# Patient Record
Sex: MALE | Race: OTHER | HISPANIC OR LATINO
[De-identification: names, ages, dates, MRNs, and addresses within clinical notes are randomized per-mention and may not be internally consistent; named-entity substitution may affect disease eponyms.]

---

## 2023-01-17 PROBLEM — Z00.00 ENCOUNTER FOR PREVENTIVE HEALTH EXAMINATION: Status: ACTIVE | Noted: 2023-01-17

## 2023-01-19 ENCOUNTER — APPOINTMENT (OUTPATIENT)
Dept: PHYSICAL MEDICINE AND REHAB | Facility: CLINIC | Age: 68
End: 2023-01-19

## 2023-02-07 ENCOUNTER — APPOINTMENT (OUTPATIENT)
Dept: PHYSICAL MEDICINE AND REHAB | Facility: CLINIC | Age: 68
End: 2023-02-07
Payer: MEDICARE

## 2023-02-07 VITALS
DIASTOLIC BLOOD PRESSURE: 72 MMHG | OXYGEN SATURATION: 96 % | SYSTOLIC BLOOD PRESSURE: 118 MMHG | HEIGHT: 69 IN | WEIGHT: 205 LBS | BODY MASS INDEX: 30.36 KG/M2 | HEART RATE: 84 BPM

## 2023-02-07 DIAGNOSIS — M25.662 STIFFNESS OF LEFT KNEE, NOT ELSEWHERE CLASSIFIED: ICD-10-CM

## 2023-02-07 PROCEDURE — 99205 OFFICE O/P NEW HI 60 MIN: CPT

## 2023-02-07 PROCEDURE — 73564 X-RAY EXAM KNEE 4 OR MORE: CPT | Mod: LT

## 2023-02-07 RX ORDER — HYALURONATE SODIUM 30 MG/2 ML
30 SYRINGE (ML) INTRAARTICULAR
Qty: 3 | Refills: 0 | Status: ACTIVE | COMMUNITY
Start: 2023-02-07 | End: 1900-01-01

## 2023-02-08 NOTE — ASSESSMENT
[FreeTextEntry1] :                                                       Assessment/Plan:\par \par LIBIA ISABEL is a 67 year male with left knee pain here for initial consultation.\par \par Osteoarthritis of the knee, left\par Weakness of the knee, left\par Effusion of the knee, left\par Derangement of the lateral meniscus, left\par Antalgic gait\par \par - Tiers of treatment and management of above diagnosis(es) were discussed with patient\par - Optimal diet, weight, sleep, and lifestyle management to minimize stress and maximize well being counseling provided\par - Imaging reviewed and discussed with patient\par - Patient was advised to start a structured, targeted therapy program 2-3x/wk for 8 wks with goal toward HEP\par - Patient was educated on an appropriate home exercise program, provided with exercise recommendations, all questions answered\par - Patient may trial acetaminophen 1000mg up to TID PRN moderate to severe pain and to decrease average consumption of NSAIDs\par - Patient was advised to start Meloxicam 15 mg daily with food/milk for 5-7 days to help with pain and to decrease inflammation, afterwards as needed\par - Patient may trial topical diclofenac 1% gel QID to the site of their worst pain (Knee) for the next 7 to 10 days to help with pain and relieve inflammation. Afterwards, they were advised to use only as needed.\par - Patient was advised to apply cool compresses or warm heat to affected regions PRN\par - Radiographs of knee ordered and reviewed today \par \par - Given that the pt has not improved with has not improved with tylenol, ibuprofen, naproxen, meloxicam and physical therapy/home exercise program>6 weeks, ultrasound guided left knee viscosupplementation with orthovisc or similar (series of three) was recommended. The purpose, risks, benefits, alternatives were discussed. The patient agreed to proceed. Will schedule once authorization obtained.\par \par - Follow up in person in 1 month to assess their progress, for viscosupplementation series\par \par I have personally spent a total of at least 65 minutes preparing, reviewing internal and external records, explaining, counseling, and coordinating care for this patient encounter.\par \par Thank you, Dr, for allowing me to participate in the care of your patient. Please do not hesitate to contact me with questions/concerns.\par \par Chip Shaffer M.D.\par Sports and Interventional Spine\par Department of Physical Medicine and Rehabilitation \par [facility] \par Email: [email or web address]\par \par [facility]\par [facility]\par [address]\par [facility], 11th Floor\[place] [address]\par \par Appointments: [phone]\par Fax: [phone]\par

## 2023-02-08 NOTE — DATA REVIEWED
[Plain X-Rays] : plain X-Rays [FreeTextEntry1] : Weight bearing x-rays of the LEFT knees (AP, flexion weight bearing, lateral, and merchant views) demonstrate medial > lateral joint space narrowing, neg fracture or dislocation, + narrowing in the patellofemoral joint, osteophytes along the lateral patellar border\par

## 2023-02-08 NOTE — PHYSICAL EXAM
[FreeTextEntry1] : GEN: NAD, well dressed, well nourished\par HEENT: NCAT, oropharynx clear\par CV: S1S2, RRR\par RESP: on room air, no labored breathing\par ABD: non distended\par EXT: well perfused, no calf tenderness\par \par RIGHT KNEE:\par no scars\par no effusion\par no laceration\par no increased warmth\par no erythema\par no varus deformity\par no valgus deformity\par absent J sign\par \par ROM\par Full range of motion in extension, no flexion contracture\par No added AROM/PROM with knee extension\par Full range of motion in flexion, 0-135\par \par Palpation\par + crepitus\par neg TTP over the quadriceps tendon\par neg TTP over the base of the patella\par neg TTP over the apex of the patella\par neg TTP over medial border of the patella\par neg TTP over lateral border of the patella\par +TTP over the medial joint line\par +TTP over the lateral joint line\par neg TTP over pes anserine area in the medial face of the tibia\par neg TTP over tibial tuberosity\par neg TTP over fibular head\par neg TTP over the popliteal fossa\par \par Manual Muscle Testing\par 5/5 in all planes with resisted isometric stress\par \par +laxity with varus stress with the knee extended at 0\par neg laxity with varus stress with the knee extended at 30\par neg laxity with valgus stress with the knee extended at 0\par +laxity with valgus stress with the knee extended at 30\par neg Lachmann Test\par neg Anterior drawer\par neg Posterior drawer\par +Bae’s Sign\par +Patellar apprehension\par neg Apley’s Rotation-Distraction Test (increased rotation, ligamentous) vs contralateral limb\par neg Apley’s Rotation-Compression Test (decreased rotation, meniscal) vs contralateral limb\par neg Barb Test with Tibia Externally Rotated (MM)\par neg Barb Test with Tibia Internally Rotated (LM)\par neg Thessaly Test\par \par Sensation intact to light touch over all aspects of the right lower leg\par Distal pulses intact\par \par LEFT KNEE:\par no scars\par trace effusion\par no laceration\par no increased warmth\par no erythema\par no varus deformity\par no valgus deformity\par absent  J sign\par \par ROM\par Limited range of motion with extension, lacking terminal 2-3\par Limited range of motion with flexion, 125\par \par Palpation\par + crepitus\par neg TTP over the quadriceps tendon\par neg TTP over the base of the patella\par neg TTP over the apex of the patella\par neg TTP over medial border of the patella\par neg TTP over lateral border of the patella\par +TTP over the medial joint line\par ++TTP over the lateral joint line\par neg TTP over pes anserine area in the medial face of the tibia\par neg TTP over tibial tuberosity\par +TTP over fibular head\par neg TTP over the popliteal fossa\par \par Manual Muscle Testing\par 4/5 in all planes with resisted isometric stress\par \par neg laxity with varus stress with the knee extended at 0\par neg laxity with varus stress with the knee extended at 30\par +laxity with valgus stress with the knee extended at 0\par +laxity with valgus stress with the knee extended at 30\par neg Lachmann Test\par neg Anterior drawer\par neg Posterior drawer\par +Bae’s Sign\par neg Patellar apprehension\par neg Apley’s Rotation-Distraction Test (increased rotation, ligamentous) vs contralateral limb\par neg Apley’s Rotation-Compression Test (decreased rotation, meniscal) vs contralateral limb\par neg Barb Test with Tibia Externally Rotated (MM)\par +Barb Test with Tibia Internally Rotated (LM)\par neg Thessaly Test\par \par Sensation intact to light touch over all aspects of the left lower leg\par Distal pulses intact\par \par + Antalgic gait\par \par

## 2023-02-08 NOTE — HISTORY OF PRESENT ILLNESS
[FreeTextEntry1] : Chip Shaffer M.D.\par Sports Medicine and Interventional Spine\par Department of Physical Medicine and Rehabilitation \par [facility] \par Email: [email or web address] <[email or web address]>\par \par [facility]\par [facility] [place] [facility]\par [address]\par [facility], 11th Floor\[place] [address]\par \par [facility]\par [facility] [place] at [facility]\par [address], 4th Floor\[place] [address]\par \par [facility] at \par [facility]\par [address], 6th Floor\[place] [address]\par \par [facility] at [facility]\par [address]\[place] [address]\par \par [facility] [place] \par [facility] at [facility]\par [address] [facility], [address]\[place] [address]\par \par For [place] Appointments\par Phone: [phone]\par Fax: [phone]\par \par For [place] Appointments\par Phone: [phone]\par Fax: [phone]\par \par \par ----------------------------------------------------------------------------------------------------------------------------------------\par \par PATIENT: LIBIA ISABEL \par MRN: [id number] \par YOB: 1955 \par DATE OF VISIT: 02/07/2023 \par Referred by PCP\par PCP ADDRESS:\[facility] \par Feb 07, 2023 \par \par \par Dear  \par \par Thank you for referring LIBIA ISABEL to my Sports and Interventional Spine practice and office. Enclosed is a copy of the patient's consultation/progress note, which includes my complete assessment and recent studies completed during the patient's evaluation.\par \par If you have questions or have any patients who require nonsurgical, non-opiate management of any sports, spine, or musculoskeletal conditions, please do not hesitate to contact my , Allie Madison at [phone].\par \par I look forward to taking care of your patients along with you.\par \par Sincerely,\par \par Chip\par \par Chip Shaffer MD\par Sports, Interventional Spine, & Regenerative Musculoskeletal Medicine\par [facility] [place] [facility]\par Email: [email or web address]\par \par \par                                                   Initial Consultation:\par CC: left knee pain \par \par HPI:  This is the first visit to [facility] [place] [facility] Interventional Spine Practice.  \par \par LIBIA ISABEL presents with the chief complaint as above.  \par \par Initial Hx on 02/07/2023 :\par Presents in person to [facility]\par patient reports left knee pain since 2020\par patient had a workup out of state in [place] including radiographs 1/2023\par pain occurs over the left anterolateral left knee, pain over the proximal tibia as well over the popliteal fossa as wel\par The patient’s difficulties began 2020, worsening over the past few weeks\par patient reports being involved in basketball and running activities as recently as 3-4 weeks ago\par of note, patient had arthroscopic surgery of the B/L knees at [facility] \par The pain is graded as 4/10\par The pain is described as sharp, stabbing\par The pain is intermittently worse during squatting, running\par The pain does not radiate\par The patient feels that the pain is overall persistent\par Patient denies other recent fall, MVA, injury, trauma, or accident besides presenting history above\par \par Aggravating:  navigating stairs, ambulation, running, squatting, prolonged sitting, prolonged standing, sit to stand transitional movements, HIIT classes\par Alleviating: rest, activity modification, pharmacologic treatments\par \par Meds: denies regular PO pain medications, ibuprofen 800mg\par Therapy Program: no recent structured targeted therapy program\par HEP: doing HEP regularly\par \par Assoc Sx:\par Denies numbness, Tingling\par Denies Focal motor weakness in the upper or lower limbs\par Denies New or worsened bowel or bladder incontinence\par Denies Saddle anesthesia\par Denies Buckling\par Denies Using Orthotic(s)/Supportive devices\par + Swelling in the upper/lower extremities, left knee as recently as 3 weeks ago\par Denies Clicking\par They also deny frequent tripping, falling\par \par ROS: A 14 point review of systems was completed. Positive findings are pain as described above. The remaining systems negative.\par \par Prostate Hx: up to date\par COVID HX: reviewed\par \par Assoc Hx:\par Ambulates without assistive device\par Injection Hx: denies locally directed treatment to the area in question\par Imaging Hx: reviewed\par \par Level of functioning: indep with ambulation, indep with ADLs\par Living Situation: dwelling with steps to enter

## 2023-03-16 RX ORDER — MELOXICAM 15 MG/1
15 TABLET ORAL
Qty: 30 | Refills: 0 | Status: ACTIVE | COMMUNITY
Start: 2023-02-07 | End: 1900-01-01

## 2023-03-27 ENCOUNTER — APPOINTMENT (OUTPATIENT)
Dept: PHYSICAL MEDICINE AND REHAB | Facility: CLINIC | Age: 68
End: 2023-03-27
Payer: MEDICARE

## 2023-03-27 VITALS
HEART RATE: 81 BPM | SYSTOLIC BLOOD PRESSURE: 144 MMHG | BODY MASS INDEX: 30.36 KG/M2 | WEIGHT: 205 LBS | OXYGEN SATURATION: 96 % | DIASTOLIC BLOOD PRESSURE: 82 MMHG | HEIGHT: 69 IN

## 2023-03-27 DIAGNOSIS — M17.12 UNILATERAL PRIMARY OSTEOARTHRITIS, LEFT KNEE: ICD-10-CM

## 2023-03-27 DIAGNOSIS — M25.462 EFFUSION, LEFT KNEE: ICD-10-CM

## 2023-03-27 DIAGNOSIS — M23.8X2 OTHER INTERNAL DERANGEMENTS OF LEFT KNEE: ICD-10-CM

## 2023-03-27 DIAGNOSIS — M17.0 BILATERAL PRIMARY OSTEOARTHRITIS OF KNEE: ICD-10-CM

## 2023-03-27 DIAGNOSIS — R29.898 OTHER SYMPTOMS AND SIGNS INVOLVING THE MUSCULOSKELETAL SYSTEM: ICD-10-CM

## 2023-03-27 PROCEDURE — 20610 DRAIN/INJ JOINT/BURSA W/O US: CPT | Mod: LT

## 2023-03-27 PROCEDURE — 99215 OFFICE O/P EST HI 40 MIN: CPT | Mod: 25

## 2023-03-28 ENCOUNTER — OUTPATIENT (OUTPATIENT)
Dept: OUTPATIENT SERVICES | Facility: HOSPITAL | Age: 68
LOS: 1 days | End: 2023-03-28
Payer: MEDICARE

## 2023-03-28 ENCOUNTER — RESULT REVIEW (OUTPATIENT)
Age: 68
End: 2023-03-28

## 2023-03-28 PROCEDURE — 73564 X-RAY EXAM KNEE 4 OR MORE: CPT

## 2023-03-28 PROCEDURE — 73564 X-RAY EXAM KNEE 4 OR MORE: CPT | Mod: 26,RT

## 2023-03-28 NOTE — ASSESSMENT
[FreeTextEntry1] :                                                       Assessment/Plan:\par \par LIBIA ISABEL is a 67 year male with left knee pain here for follow up visit\par \par Osteoarthritis of the knee, left\par Weakness of the knee, left\par Effusion of the knee, left\par Derangement of the lateral meniscus, left\par Antalgic gait\par Right knee pain, chronic\par \par - Tolerated injection in office today without complications\par - Tiers of treatment and management of above diagnosis(es) were discussed with patient\par - Optimal diet, weight, sleep, and lifestyle management to minimize stress and maximize well being counseling provided\par - Imaging reviewed and discussed with patient\par - Patient was advised to start a structured, targeted therapy program 2-3x/wk for 8 wks with goal toward HEP\par - Patient was educated on an appropriate home exercise program, provided with exercise recommendations, all questions answered\par - Patient may trial acetaminophen 1000mg up to TID PRN moderate to severe pain and to decrease average consumption of NSAIDs\par - Patient was advised to start Meloxicam 15 mg daily with food/milk for 5-7 days to help with pain and to decrease inflammation, afterwards as needed\par - Patient may trial topical diclofenac 1% gel QID to the site of their worst pain (Knee) for the next 7 to 10 days to help with pain and relieve inflammation. Afterwards, they were advised to use only as needed.\par - Patient was advised to apply cool compresses or warm heat to affected regions PRN\par - Radiographs of right knee ordered today \par \par - Follow up in person in 1 month to assess their progress, for viscosupplementation series\par \par I have personally spent a total of at least 65 minutes preparing, reviewing internal and external records, explaining, counseling, and coordinating care for this patient encounter.\par \par Thank you, , for allowing me to participate in the care of your patient. Please do not hesitate to contact me with questions/concerns.\par \par Chip Shaffer M.D.\par Sports and Interventional Spine\par Department of Physical Medicine and Rehabilitation \par [facility] \par Email: [email or web address]\par \par [facility]\par [facility]\par [address]\par [facility], 11th Floor\[place] [address]\par \par Appointments: [phone]\par Fax: [phone]\par

## 2023-03-28 NOTE — HISTORY OF PRESENT ILLNESS
[FreeTextEntry1] : Chip Shaffer M.D.\par Sports Medicine and Interventional Spine\par Department of Physical Medicine and Rehabilitation \par [facility] \par Email: [email or web address] <[email or web address]>\par \par [facility]\par [facility] [place] [facility]\par [address]\par [facility], 11th Floor\[place] [address]\par \par [facility]\par [facility] [place] at [facility]\par [address], 4th Floor\[place] [address]\par \par [facility] at \par Critical access hospital\par [address], 6[address] Floor\[place] [address]\par \par [facility] at [facility]\par [address] [facility]\[place] [address]\par \par [facility] [place] \par [facility]\par [address]\[place] [address]\par \par For [place] Appointments\par Phone: [phone]\par Fax: [phone]\par \par For [place] Appointments\par Phone: [phone]\par Fax: [phone]\par \par \par ----------------------------------------------------------------------------------------------------------------------------------------\par \par PATIENT: LIBIA ISABEL \par MRN: [id number] \par YOB: 1955 \par DATE OF VISIT: 3/27/2023\par Referred by PCP\par PCP ADDRESS:\[facility] \par Mar 27, 2023 \par \par \par Dear  \par \par Thank you for referring LIBIA ISABEL to my Sports and Interventional Spine practice and office. Enclosed is a copy of the patient's consultation/progress note, which includes my complete assessment and recent studies completed during the patient's evaluation.\par \par If you have questions or have any patients who require nonsurgical, non-opiate management of any sports, spine, or musculoskeletal conditions, please do not hesitate to contact my , Allie Madison at [phone].\par \par I look forward to taking care of your patients along with you.\par \par Sincerely,\par \par Chip\par \par Chip Shaffer MD\par Sports, Interventional Spine, & Regenerative Musculoskeletal Medicine\par [facility] [place] [facility]\par Email: [email or web address]\par \par \par                                                   Follow up Visit\par CC: left knee pain \par \par HPI:  This is a follow up visit to [facility] [place] [facility] Sports Medicine and Interventional Spine Practice.  \par \par LIBIA ISABEL presents with the chief complaint as above.  \par \par Interval Hx on Mar 27, 2023: presents for follow up. Since last visit, they continue to have left knee pain, has been using their  brace, had the brace re-adjusted with Eschen, had substantial relief during appropriate use of the brace. Patient has been having some right knee pain as well, patient interested in having that area surveyed.\par There have been no significant changes to their aggravating or alleviating factors since the last visit. Pharmacologic treatments now include OTC analgesics PRN, but otherwise pharmacologic treatments are minimal.\par Denies new or worsened numbness, tingling, or focal motor deficit. Denies interval fall, accident, or injury. Denies change in bowel or bladder functioning. Patient has had no interval swelling of the leg, has continued aquatic exercises and other home exercise program, attending gym sessions on a weekly basis. \par \par Meds: denies regular PO pain medications, ibuprofen 800mg\par Therapy Program: no recent structured targeted therapy program\par HEP: doing HEP regularly\par \par Assoc Sx:\par Denies numbness, Tingling\par Denies Focal motor weakness in the upper or lower limbs\par Denies New or worsened bowel or bladder incontinence\par Denies Saddle anesthesia\par Denies Buckling\par Denies Using Orthotic(s)/Supportive devices\par + Swelling in the upper/lower extremities, left knee as recently as 3 weeks ago\par Denies Clicking\par They also deny frequent tripping, falling\par \par ROS: A 14 point review of systems was completed. Positive findings are pain as described above. The remaining systems negative.\par \par Prostate Hx: up to date\par COVID HX: reviewed\par \par Initial Hx on 02/07/2023: Presents in person to [facility]. patient reports left knee pain since 2020. patient had a workup out of state in [place] including radiographs 1/2023. pain occurs over the left anterolateral left knee, pain over the proximal tibia as well over the popliteal fossa as well. The patient’s difficulties began 2020, worsening over the past few weeks. patient reports being involved in basketball and running activities as recently as 3-4 weeks ago. of note, patient had arthroscopic surgery of the B/L knees at [facility]. The pain is graded as 4/10. The pain is described as sharp, stabbing. The pain is intermittently worse during squatting, running. The pain does not radiate. The patient feels that the pain is overall persistent. Patient denies other recent fall, MVA, injury, trauma, or accident besides presenting history above. Aggravating:  navigating stairs, ambulation, running, squatting, prolonged sitting, prolonged standing, sit to stand transitional movements, HIIT classes. Alleviating: rest, activity modification, pharmacologic treatments\par \par Assoc Hx:\par Ambulates without assistive device\par Injection Hx: denies locally directed treatment to the area in question\par Imaging Hx: reviewed\par \par Level of functioning: indep with ambulation, indep with ADLs\par Living Situation: dwelling with steps to enter

## 2023-03-28 NOTE — PROCEDURE
[de-identified] : PROCEDURE: Viscosupplementation injection #1 of 1\par \par Patient: LIBIA ISABEL \par MRN #: [id number]\par : 1955          					\par Date: 2023  \par \par Physician: Chip Shaffer MD\par \par Joint	Knee\par Side	Left\par Substance	durolane\par \par Lot number [id number]	\par Expiration date	2025\par \par Procedure note: The patient was positioned sitting at the edge of the examination table.  After risks, benefits and alternatives were discussed, the patient signed informed consent. Infero-lateral border of the patella was identified by palpation and indented with a needle cap without trauma to the skin. The inferolateral knee region was then prepped with chloraprep. Topical anesthesia spray was then applied to the inferolateral knee. A 22G 1.5" needle was then inserted into the joint. After negative aspiration, orthovisc 2 mL was then injected into the joint's inferolateral approach. Excellent flow of fluid was noted in the joint space.  There was no bleeding or complications.  Performed on the left knee. The patient tolerated the procedure well.\par

## 2023-03-31 ENCOUNTER — APPOINTMENT (OUTPATIENT)
Dept: PHYSICAL MEDICINE AND REHAB | Facility: CLINIC | Age: 68
End: 2023-03-31
Payer: MEDICARE

## 2023-03-31 PROCEDURE — 99443: CPT

## 2023-06-05 DIAGNOSIS — M17.11 UNILATERAL PRIMARY OSTEOARTHRITIS, RIGHT KNEE: ICD-10-CM

## 2023-06-05 DIAGNOSIS — M25.661 STIFFNESS OF RIGHT KNEE, NOT ELSEWHERE CLASSIFIED: ICD-10-CM
